# Patient Record
Sex: MALE | Race: WHITE | Employment: UNEMPLOYED | URBAN - METROPOLITAN AREA
[De-identification: names, ages, dates, MRNs, and addresses within clinical notes are randomized per-mention and may not be internally consistent; named-entity substitution may affect disease eponyms.]

---

## 2022-10-08 ENCOUNTER — HOSPITAL ENCOUNTER (EMERGENCY)
Age: 52
Discharge: HOME | End: 2022-10-09
Payer: COMMERCIAL

## 2022-10-08 ENCOUNTER — APPOINTMENT (OUTPATIENT)
Dept: GENERAL RADIOLOGY | Age: 52
End: 2022-10-08
Payer: COMMERCIAL

## 2022-10-08 ENCOUNTER — APPOINTMENT (OUTPATIENT)
Dept: CT IMAGING | Age: 52
End: 2022-10-08
Payer: COMMERCIAL

## 2022-10-08 DIAGNOSIS — R19.7 NAUSEA VOMITING AND DIARRHEA: Primary | ICD-10-CM

## 2022-10-08 DIAGNOSIS — R11.2 NAUSEA VOMITING AND DIARRHEA: Primary | ICD-10-CM

## 2022-10-08 LAB
ALBUMIN SERPL-MCNC: 4.5 G/DL (ref 3.5–4.6)
ALP BLD-CCNC: 74 U/L (ref 35–104)
ALT SERPL-CCNC: 14 U/L (ref 0–41)
ANION GAP SERPL CALCULATED.3IONS-SCNC: 18 MEQ/L (ref 9–15)
AST SERPL-CCNC: 12 U/L (ref 0–40)
BACTERIA: NEGATIVE /HPF
BETA-HYDROXYBUTYRATE: 3.4 MG/DL (ref 0.2–2.8)
BILIRUB SERPL-MCNC: 0.9 MG/DL (ref 0.2–0.7)
BILIRUBIN URINE: NEGATIVE
BLOOD, URINE: NEGATIVE
BUN BLDV-MCNC: 15 MG/DL (ref 6–20)
CALCIUM SERPL-MCNC: 8.8 MG/DL (ref 8.5–9.9)
CHLORIDE BLD-SCNC: 97 MEQ/L (ref 95–107)
CLARITY: CLEAR
CO2: 17 MEQ/L (ref 20–31)
COLOR: YELLOW
CREAT SERPL-MCNC: 0.87 MG/DL (ref 0.7–1.2)
EPITHELIAL CELLS, UA: ABNORMAL /HPF (ref 0–5)
GFR AFRICAN AMERICAN: >60
GFR NON-AFRICAN AMERICAN: >60
GLOBULIN: 2.6 G/DL (ref 2.3–3.5)
GLUCOSE BLD-MCNC: 173 MG/DL (ref 70–99)
GLUCOSE BLD-MCNC: 183 MG/DL (ref 70–99)
GLUCOSE URINE: NEGATIVE MG/DL
HYALINE CASTS: ABNORMAL /HPF (ref 0–5)
INFLUENZA A BY PCR: NEGATIVE
INFLUENZA B BY PCR: NEGATIVE
KETONES, URINE: ABNORMAL MG/DL
LACTIC ACID: 1.5 MMOL/L (ref 0.5–2.2)
LEUKOCYTE ESTERASE, URINE: NEGATIVE
LIPASE: 17 U/L (ref 12–95)
MAGNESIUM: 1.6 MG/DL (ref 1.7–2.4)
NITRITE, URINE: NEGATIVE
PERFORMED ON: ABNORMAL
PH UA: 5 (ref 5–9)
POTASSIUM SERPL-SCNC: 3.7 MEQ/L (ref 3.4–4.9)
PROTEIN UA: 30 MG/DL
RBC UA: ABNORMAL /HPF (ref 0–5)
SARS-COV-2, NAAT: NOT DETECTED
SODIUM BLD-SCNC: 132 MEQ/L (ref 135–144)
SPECIFIC GRAVITY UA: 1.03 (ref 1–1.03)
TOTAL PROTEIN: 7.1 G/DL (ref 6.3–8)
URINE REFLEX TO CULTURE: ABNORMAL
UROBILINOGEN, URINE: 0.2 E.U./DL
WBC UA: ABNORMAL /HPF (ref 0–5)

## 2022-10-08 PROCEDURE — 96374 THER/PROPH/DIAG INJ IV PUSH: CPT

## 2022-10-08 PROCEDURE — 96375 TX/PRO/DX INJ NEW DRUG ADDON: CPT

## 2022-10-08 PROCEDURE — 81001 URINALYSIS AUTO W/SCOPE: CPT

## 2022-10-08 PROCEDURE — 83735 ASSAY OF MAGNESIUM: CPT

## 2022-10-08 PROCEDURE — 36415 COLL VENOUS BLD VENIPUNCTURE: CPT

## 2022-10-08 PROCEDURE — 83605 ASSAY OF LACTIC ACID: CPT

## 2022-10-08 PROCEDURE — 74177 CT ABD & PELVIS W/CONTRAST: CPT

## 2022-10-08 PROCEDURE — 85025 COMPLETE CBC W/AUTO DIFF WBC: CPT

## 2022-10-08 PROCEDURE — 2500000003 HC RX 250 WO HCPCS

## 2022-10-08 PROCEDURE — A4216 STERILE WATER/SALINE, 10 ML: HCPCS

## 2022-10-08 PROCEDURE — 83690 ASSAY OF LIPASE: CPT

## 2022-10-08 PROCEDURE — 87502 INFLUENZA DNA AMP PROBE: CPT

## 2022-10-08 PROCEDURE — 82010 KETONE BODYS QUAN: CPT

## 2022-10-08 PROCEDURE — 99285 EMERGENCY DEPT VISIT HI MDM: CPT

## 2022-10-08 PROCEDURE — 87635 SARS-COV-2 COVID-19 AMP PRB: CPT

## 2022-10-08 PROCEDURE — 6360000004 HC RX CONTRAST MEDICATION

## 2022-10-08 PROCEDURE — 80053 COMPREHEN METABOLIC PANEL: CPT

## 2022-10-08 PROCEDURE — 2580000003 HC RX 258

## 2022-10-08 PROCEDURE — 36600 WITHDRAWAL OF ARTERIAL BLOOD: CPT

## 2022-10-08 PROCEDURE — 71045 X-RAY EXAM CHEST 1 VIEW: CPT

## 2022-10-08 PROCEDURE — 6360000002 HC RX W HCPCS

## 2022-10-08 PROCEDURE — 6370000000 HC RX 637 (ALT 250 FOR IP)

## 2022-10-08 RX ORDER — ONDANSETRON 2 MG/ML
4 INJECTION INTRAMUSCULAR; INTRAVENOUS ONCE
Status: COMPLETED | OUTPATIENT
Start: 2022-10-08 | End: 2022-10-08

## 2022-10-08 RX ORDER — 0.9 % SODIUM CHLORIDE 0.9 %
1000 INTRAVENOUS SOLUTION INTRAVENOUS ONCE
Status: COMPLETED | OUTPATIENT
Start: 2022-10-08 | End: 2022-10-09

## 2022-10-08 RX ADMIN — SODIUM CHLORIDE 1000 ML: 9 INJECTION, SOLUTION INTRAVENOUS at 22:49

## 2022-10-08 RX ADMIN — LIDOCAINE HYDROCHLORIDE: 20 SOLUTION ORAL; TOPICAL at 22:50

## 2022-10-08 RX ADMIN — ONDANSETRON 4 MG: 2 INJECTION INTRAMUSCULAR; INTRAVENOUS at 22:51

## 2022-10-08 RX ADMIN — FAMOTIDINE 20 MG: 10 INJECTION, SOLUTION INTRAVENOUS at 22:51

## 2022-10-08 RX ADMIN — IOPAMIDOL 75 ML: 612 INJECTION, SOLUTION INTRAVENOUS at 23:37

## 2022-10-08 ASSESSMENT — PAIN - FUNCTIONAL ASSESSMENT: PAIN_FUNCTIONAL_ASSESSMENT: NONE - DENIES PAIN

## 2022-10-09 VITALS
SYSTOLIC BLOOD PRESSURE: 142 MMHG | TEMPERATURE: 99.8 F | RESPIRATION RATE: 21 BRPM | HEART RATE: 84 BPM | OXYGEN SATURATION: 98 % | BODY MASS INDEX: 40.43 KG/M2 | HEIGHT: 74 IN | WEIGHT: 315 LBS | DIASTOLIC BLOOD PRESSURE: 72 MMHG

## 2022-10-09 LAB
ACANTHOCYTES: 0
ANISOCYTOSIS: 0
AUER RODS: 0
BASE EXCESS VENOUS: -6 (ref -3–3)
BASOPHILIC STIPPLING: 0
BASOPHILS ABSOLUTE: 0 K/UL (ref 0–0.2)
BASOPHILS RELATIVE PERCENT: 0.4 %
BURR CELLS: 0
CABOT RINGS: 0
CALCIUM IONIZED: 1.05 MMOL/L (ref 1.12–1.32)
DOHLE BODIES: 0
EOSINOPHILS ABSOLUTE: 0 K/UL (ref 0–0.7)
EOSINOPHILS RELATIVE PERCENT: 0.1 %
GFR AFRICAN AMERICAN: >60
GFR AFRICAN AMERICAN: >60
GFR NON-AFRICAN AMERICAN: >60
GFR NON-AFRICAN AMERICAN: >60
GLUCOSE BLD-MCNC: 152 MG/DL (ref 70–99)
HAIRY CELLS: 0
HCO3 VENOUS: 17.7 MMOL/L (ref 23–29)
HCT VFR BLD CALC: 46.2 % (ref 42–52)
HEMOGLOBIN: 15.6 GM/DL (ref 13.5–17.5)
HEMOGLOBIN: 15.9 G/DL (ref 14–18)
HOWELL-JOLLY BODIES: 0
HYPERSEGMENTED NEUTROPHILS: 0
HYPOCHROMIA: 0
LACTATE: 2.19 MMOL/L (ref 0.4–2)
LYMPHOCYTES ABSOLUTE: 0.6 K/UL (ref 1–4.8)
LYMPHOCYTES RELATIVE PERCENT: 5 %
MACROCYTES: 0
MCH RBC QN AUTO: 31.7 PG (ref 27–31.3)
MCHC RBC AUTO-ENTMCNC: 34.5 % (ref 33–37)
MCV RBC AUTO: 92.1 FL (ref 80–100)
MICROCYTES: 0
MONOCYTES ABSOLUTE: 0.4 K/UL (ref 0.2–0.8)
MONOCYTES RELATIVE PERCENT: 2.8 %
NEUTROPHILS ABSOLUTE: 11.4 K/UL (ref 1.4–6.5)
NEUTROPHILS RELATIVE PERCENT: 93 %
O2 SAT, VEN: 100 %
OVALOCYTES: 0
PAPPENHEIMER BODIES: 0
PCO2, VEN: 25.1 MM HG (ref 40–50)
PDW BLD-RTO: 13.1 % (ref 11.5–14.5)
PELGER HUET CELLS: 0 %
PERFORMED ON: ABNORMAL
PERFORMED ON: NORMAL
PH VENOUS: 7.46 (ref 7.32–7.42)
PLATELET # BLD: 213 K/UL (ref 130–400)
PLATELET SLIDE REVIEW: NORMAL
PO2, VEN: 225 MM HG
POC CHLORIDE: 105 MEQ/L (ref 99–110)
POC CREATININE: 0.9 MG/DL (ref 0.8–1.3)
POC CREATININE: 0.9 MG/DL (ref 0.8–1.3)
POC FIO2: 21
POC HEMATOCRIT: 46 % (ref 41–53)
POC POTASSIUM: 3.9 MEQ/L (ref 3.5–5.1)
POC SAMPLE TYPE: ABNORMAL
POC SAMPLE TYPE: NORMAL
POC SODIUM: 136 MEQ/L (ref 136–145)
POIKILOCYTES: 0
POLYCHROMASIA: 0
RBC # BLD: 5.02 M/UL (ref 4.7–6.1)
RBC # BLD: NORMAL 10*6/UL
SCHISTOCYTES: 0
SICKLE CELLS: 0
SPHEROCYTES: 0
STOMATOCYTES: 0
TARGET CELLS: 0
TCO2 CALC VENOUS: 19 MMOL/L
TEAR DROP CELLS: 0
TOXIC GRANULATION: 0
VACUOLATED NEUTROPHILS: 0
WBC # BLD: 12.3 K/UL (ref 4.8–10.8)

## 2022-10-09 PROCEDURE — 83605 ASSAY OF LACTIC ACID: CPT

## 2022-10-09 PROCEDURE — 6370000000 HC RX 637 (ALT 250 FOR IP)

## 2022-10-09 PROCEDURE — 84132 ASSAY OF SERUM POTASSIUM: CPT

## 2022-10-09 PROCEDURE — 82330 ASSAY OF CALCIUM: CPT

## 2022-10-09 PROCEDURE — 85014 HEMATOCRIT: CPT

## 2022-10-09 PROCEDURE — 82565 ASSAY OF CREATININE: CPT

## 2022-10-09 PROCEDURE — 84295 ASSAY OF SERUM SODIUM: CPT

## 2022-10-09 PROCEDURE — 82435 ASSAY OF BLOOD CHLORIDE: CPT

## 2022-10-09 PROCEDURE — 82803 BLOOD GASES ANY COMBINATION: CPT

## 2022-10-09 RX ORDER — ONDANSETRON 4 MG/1
4 TABLET, ORALLY DISINTEGRATING ORAL EVERY 8 HOURS PRN
Qty: 21 TABLET | Refills: 0 | Status: SHIPPED | OUTPATIENT
Start: 2022-10-09

## 2022-10-09 RX ORDER — ACETAMINOPHEN 500 MG
1000 TABLET ORAL ONCE
Status: COMPLETED | OUTPATIENT
Start: 2022-10-09 | End: 2022-10-09

## 2022-10-09 RX ADMIN — ACETAMINOPHEN 1000 MG: 500 TABLET ORAL at 01:45

## 2022-10-09 ASSESSMENT — ENCOUNTER SYMPTOMS
PHOTOPHOBIA: 0
NAUSEA: 1
COUGH: 0
ABDOMINAL PAIN: 0
DIARRHEA: 1
ANAL BLEEDING: 0
ABDOMINAL DISTENTION: 0
VOMITING: 1
CONSTIPATION: 0
SHORTNESS OF BREATH: 0
BLOOD IN STOOL: 0

## 2022-10-09 NOTE — ED PROVIDER NOTES
[address] [facility] ED  eMERGENCY dEPARTMENT eNCOUnter      Pt Name: Orestes Doe  MRN: [id number]  [place] 1970  Date of evaluation: 10/8/2022  Provider: JAMAL Cronin        HISTORY OF PRESENT ILLNESS    Orestes Doe is a 46 y.o. male per chart review has ah/o T2DM, asthma, depression. Reports nausea vomiting diarrhea x1 day. No blood in the emesis or stool. Reports symptoms onset after dinner last evening. He had just eaten [employer]. Reports his roommate in the home is also experiencing similar symptoms however she is undergoing treatment with chemotherapy and has intermittent similar symptoms from this. Reports cyclic fever and chills. Reports cough but he feels that may be a sensation of having to vomit. Reports urinary frequency. Reports history of type 2 diabetes, states last A1c was 10, states he only takes oral diabetic medications, has not been able to tolerate for the past day 2/2 emesis. Denies diabetic complications at this point. Denies history of DKA or hospitalization for diabetes. Denies any abdominal pains. No shortness of breath or chest pains. No CAD history. No prior abdominal conditions or surgeries. REVIEW OF SYSTEMS       Review of Systems   Constitutional:  Positive for appetite change, chills and fever. Negative for diaphoresis and fatigue. HENT:  Negative for congestion. Eyes:  Negative for photophobia and visual disturbance. Respiratory:  Negative for cough and shortness of breath. Cardiovascular:  Negative for chest pain. Gastrointestinal:  Positive for diarrhea, nausea and vomiting. Negative for abdominal distention, abdominal pain, anal bleeding, blood in stool and constipation. Genitourinary:  Positive for frequency. Negative for difficulty urinating, dysuria, flank pain, hematuria, penile pain and urgency. Musculoskeletal:  Negative for myalgias. Neurological:  Negative for weakness, light-headedness and headaches. Psychiatric/Behavioral:  Negative for confusion. Except as noted above the remainder of the review of systems was reviewed and negative. PAST MEDICAL HISTORY   History reviewed. No pertinent past medical history. SURGICAL HISTORY     History reviewed. No pertinent surgical history. CURRENT MEDICATIONS       Previous Medications    No medications on file       ALLERGIES     Glipizide    FAMILY HISTORY     History reviewed. No pertinent family history. SOCIAL HISTORY       Social History     Socioeconomic History    Marital status: Single     Spouse name: None    Number of children: None    Years of education: None    Highest education level: None   Tobacco Use    Smoking status: Every Day     Packs/day: 2.00     Types: Cigarettes    Smokeless tobacco: Never   Vaping Use    Vaping Use: Never used   Substance and Sexual Activity    Alcohol use: Not Currently    Drug use: Never    Sexual activity: Not Currently         PHYSICAL EXAM        ED Triage Vitals [10/08/22 2147]   BP Temp Temp Source Heart Rate Resp SpO2 Height Weight   (!) 143/78 99.8 °F (37.7 °C) Oral 88 18 97 % 6' 2\" (1.88 m) (!) 330 lb (149.7 kg)       Physical Exam  Constitutional:       General: He is not in acute distress. Appearance: Normal appearance. He is not ill-appearing, toxic-appearing or diaphoretic. HENT:      Head: Normocephalic and atraumatic. Right Ear: External ear normal.      Left Ear: External ear normal.      Nose: Nose normal.      Mouth/Throat:      Mouth: Mucous membranes are moist.      Pharynx: Oropharynx is clear. Eyes:      Extraocular Movements: Extraocular movements intact. Cardiovascular:      Rate and Rhythm: Normal rate and regular rhythm. Pulmonary:      Effort: Pulmonary effort is normal. No respiratory distress. Breath sounds: Normal breath sounds. No wheezing. Abdominal:      General: Bowel sounds are normal. There is no distension. Palpations: Abdomen is soft. Calcium, Ionized 1.05 (*)     pH, Arron 7.457 (*)     pCO2, Arron 25.1 (*)     HCO3, Venous 17.7 (*)     Base Excess, Arron -6 (*)     Lactate 2.19 (*)     All other components within normal limits   COVID-19, RAPID   RAPID INFLUENZA A/B ANTIGENS   LACTIC ACID   LIPASE   POCT EPOC BLOOD GAS, LACTIC ACID, ICA   POCT VENOUS         MDM:   Vitals:    Vitals:    10/08/22 2147 10/08/22 2230 10/09/22 0000   BP: (!) 143/78 139/77 (!) 142/72   Pulse: 88 82 84   Resp: 18 17 21   Temp: 99.8 °F (37.7 °C)     TempSrc: Oral     SpO2: 97%  98%   Weight: (!) 330 lb (149.7 kg)     Height: 6' 2\" (1.88 m)         59-year-old male patient to the emergency department with persistent nausea vomiting diarrhea since dinner last evening. Reports associated fever, chills, urinary frequency. Presents afebrile, VSS. Soft nondistended nontender abdomen. Nontoxic nondiaphoretic nondistressed appearing. Given IV NS bolus, IV Zofran, IV Pepcid, GI cocktail emergency department. Reports symptom improvement. No persistent nausea vomiting or diarrhea while in the emergency department. Tolerating p.o. intake. CT abdomen pelvis is nonacute. Laboratory studies demonstrate slight dehydration. Reactive mild leukocytosis. Patient not acidotic or significantly hyperglycemic. Not consistent presentation with DKA. Likely gastroenteritis. Will provide supportive medication, discussed oral hydration and soft diet for home. Discussed strict return precautions and follow-up. He understands and agrees to this plan. CRITICAL CARE TIME   Total CriticalCare time was 0 minutes, excluding separately reportable procedures. There was a high probability of clinically significant/life threatening deterioration in the patient's condition which required my urgent intervention. PROCEDURES:  Unlessotherwise noted below, none      Procedures      FINAL IMPRESSION      1.  Nausea vomiting and diarrhea          DISPOSITION/PLAN   DISPOSITION Decision To Discharge 10/09/2022 01:25:43 AM          JAMAL Zapata (electronically signed)  Attending Emergency Physician          Marcial Zapata  10/09/22 0140

## 2022-10-09 NOTE — ED NOTES
Pt understands discharge instructions.   Pt instructed to follow up with PCP   Prescriptions explained   Pt told to come back for new or worsening symptoms  No further questions         Dar Stevens RN  10/09/22 0148

## 2022-10-12 LAB
EKG ATRIAL RATE: 82 BPM
EKG P AXIS: 59 DEGREES
EKG P-R INTERVAL: 186 MS
EKG Q-T INTERVAL: 406 MS
EKG QRS DURATION: 110 MS
EKG QTC CALCULATION (BAZETT): 474 MS
EKG R AXIS: -32 DEGREES
EKG T AXIS: 52 DEGREES
EKG VENTRICULAR RATE: 82 BPM

## 2025-02-11 ENCOUNTER — OFFICE VISIT (OUTPATIENT)
Dept: GASTROENTEROLOGY | Age: 55
End: 2025-02-11
Payer: MEDICARE

## 2025-02-11 ENCOUNTER — PREP FOR PROCEDURE (OUTPATIENT)
Dept: GASTROENTEROLOGY | Age: 55
End: 2025-02-11

## 2025-02-11 VITALS — HEART RATE: 71 BPM | OXYGEN SATURATION: 96 % | BODY MASS INDEX: 40.43 KG/M2 | WEIGHT: 315 LBS | HEIGHT: 74 IN

## 2025-02-11 DIAGNOSIS — R19.7 DIARRHEA, UNSPECIFIED TYPE: ICD-10-CM

## 2025-02-11 DIAGNOSIS — R11.2 NAUSEA AND VOMITING, UNSPECIFIED VOMITING TYPE: ICD-10-CM

## 2025-02-11 DIAGNOSIS — R10.84 GENERALIZED ABDOMINAL PAIN: ICD-10-CM

## 2025-02-11 DIAGNOSIS — R11.0 NAUSEA: Primary | ICD-10-CM

## 2025-02-11 DIAGNOSIS — R15.9 INCONTINENCE OF FECES, UNSPECIFIED FECAL INCONTINENCE TYPE: ICD-10-CM

## 2025-02-11 PROCEDURE — 3017F COLORECTAL CA SCREEN DOC REV: CPT | Performed by: INTERNAL MEDICINE

## 2025-02-11 PROCEDURE — 99204 OFFICE O/P NEW MOD 45 MIN: CPT | Performed by: INTERNAL MEDICINE

## 2025-02-11 PROCEDURE — G8427 DOCREV CUR MEDS BY ELIG CLIN: HCPCS | Performed by: INTERNAL MEDICINE

## 2025-02-11 PROCEDURE — G8417 CALC BMI ABV UP PARAM F/U: HCPCS | Performed by: INTERNAL MEDICINE

## 2025-02-11 PROCEDURE — 4004F PT TOBACCO SCREEN RCVD TLK: CPT | Performed by: INTERNAL MEDICINE

## 2025-02-11 RX ORDER — PANTOPRAZOLE SODIUM 40 MG/1
40 TABLET, DELAYED RELEASE ORAL
Qty: 30 TABLET | Refills: 2 | Status: SHIPPED | OUTPATIENT
Start: 2025-02-11

## 2025-02-11 RX ORDER — ALLOPURINOL 100 MG/1
100 TABLET ORAL
COMMUNITY
Start: 2025-01-30

## 2025-02-11 RX ORDER — PIOGLITAZONE 30 MG/1
1 TABLET ORAL DAILY
COMMUNITY
Start: 2024-06-03 | End: 2025-06-04

## 2025-02-11 RX ORDER — ATORVASTATIN CALCIUM 40 MG/1
20 TABLET, FILM COATED ORAL
COMMUNITY
Start: 2024-07-30

## 2025-02-11 RX ORDER — SODIUM CHLORIDE 9 MG/ML
INJECTION, SOLUTION INTRAVENOUS PRN
Status: CANCELLED | OUTPATIENT
Start: 2025-02-11

## 2025-02-11 RX ORDER — LORATADINE 10 MG/1
10 TABLET ORAL
COMMUNITY
Start: 2025-01-30

## 2025-02-11 RX ORDER — SODIUM CHLORIDE 9 MG/ML
INJECTION, SOLUTION INTRAVENOUS CONTINUOUS
Status: CANCELLED | OUTPATIENT
Start: 2025-02-11

## 2025-02-11 RX ORDER — MONTELUKAST SODIUM 10 MG/1
1 TABLET ORAL DAILY
COMMUNITY
Start: 2024-09-05

## 2025-02-11 RX ORDER — MELOXICAM 15 MG/1
15 TABLET ORAL
COMMUNITY
Start: 2024-07-18

## 2025-02-11 RX ORDER — LITHIUM CARBONATE 450 MG
1 TABLET, EXTENDED RELEASE ORAL 2 TIMES DAILY
COMMUNITY
Start: 2024-12-26

## 2025-02-11 RX ORDER — SODIUM CHLORIDE 0.9 % (FLUSH) 0.9 %
5-40 SYRINGE (ML) INJECTION EVERY 12 HOURS SCHEDULED
Status: CANCELLED | OUTPATIENT
Start: 2025-02-11

## 2025-02-11 RX ORDER — LACTOBACILLUS RHAMNOSUS GG 10B CELL
1 CAPSULE ORAL DAILY
Qty: 30 CAPSULE | Refills: 1 | Status: SHIPPED | OUTPATIENT
Start: 2025-02-11

## 2025-02-11 RX ORDER — SODIUM CHLORIDE 0.9 % (FLUSH) 0.9 %
5-40 SYRINGE (ML) INJECTION PRN
Status: CANCELLED | OUTPATIENT
Start: 2025-02-11

## 2025-02-11 RX ORDER — ALUMINUM ZIRCONIUM OCTACHLOROHYDREX GLY 16 G/100G
GEL TOPICAL
Qty: 425 G | Refills: 3 | Status: SHIPPED | OUTPATIENT
Start: 2025-02-11

## 2025-02-11 RX ORDER — TRAZODONE HYDROCHLORIDE 100 MG/1
200 TABLET ORAL
COMMUNITY
Start: 2024-12-18

## 2025-02-11 RX ORDER — ALBUTEROL SULFATE 90 UG/1
INHALANT RESPIRATORY (INHALATION)
COMMUNITY
Start: 2024-07-30

## 2025-02-11 NOTE — PROGRESS NOTES
Gastroenterology Clinic Visit    Jc Winters  [id number]  Chief Complaint   Patient presents with    New Patient     History of Present Illness  The patient is a 54-year-old male who presents for evaluation of diarrhea.    He has been experiencing chronic diarrhea for approximately one year, with daily loose, watery bowel movements. He has not attempted any fiber supplementation. He reports generalized abdominal discomfort, which is exacerbated by coughing or sneezing, leading to incontinence. He also experiences intermittent nausea and abdominal cramping, the frequency of which varies with his activity level. He has undergone extensive diagnostic testing, including a CT scan, blood work, and stool studies, all of which have returned normal results. He is uncertain if a calprotectin test was performed. He has been referred for a hydrogen breath test to investigate potential bacterial causes of his symptoms. He has not undergone gallbladder surgery. He has a history of weight fluctuations, typically gaining about 15 pounds each winter and subsequently losing it. He has tried Greek yogurt, but it exacerbated his symptoms.    He has a long-standing history of acid reflux, for which he was prescribed medication, but he has not been compliant with the treatment. He recalls that the medication seemed to worsen his symptoms. He occasionally uses Tums for symptom relief.    He has a history of tardive dyskinesia, characterized by tremors in his right hand and foot, which were severe until a certain medication was discontinued. He still experiences these symptoms when nervous or anxious, particularly when writing or using a screwdriver.    He has been diagnosed with diabetes for over four years, with a recent HbA1c level of 5.8.    He has a history of severe depression and general anxiety disorder. He is under the care of a psychiatrist, Dr. Ge Cisneros, who monitors his weight and blood pressure during visits.    He has

## 2025-03-20 ENCOUNTER — TELEPHONE (OUTPATIENT)
Dept: GASTROENTEROLOGY | Age: 55
End: 2025-03-20

## 2025-03-21 ENCOUNTER — ANESTHESIA (OUTPATIENT)
Dept: ENDOSCOPY | Age: 55
End: 2025-03-21
Payer: OTHER GOVERNMENT

## 2025-03-21 ENCOUNTER — HOSPITAL ENCOUNTER (OUTPATIENT)
Age: 55
Setting detail: OUTPATIENT SURGERY
Discharge: HOME | End: 2025-03-21
Attending: INTERNAL MEDICINE | Admitting: INTERNAL MEDICINE
Payer: OTHER GOVERNMENT

## 2025-03-21 ENCOUNTER — ANESTHESIA EVENT (OUTPATIENT)
Dept: ENDOSCOPY | Age: 55
End: 2025-03-21
Payer: OTHER GOVERNMENT

## 2025-03-21 VITALS
OXYGEN SATURATION: 97 % | DIASTOLIC BLOOD PRESSURE: 60 MMHG | SYSTOLIC BLOOD PRESSURE: 125 MMHG | TEMPERATURE: 98.2 F | HEIGHT: 74 IN | HEART RATE: 52 BPM | RESPIRATION RATE: 18 BRPM | WEIGHT: 315 LBS | BODY MASS INDEX: 40.43 KG/M2

## 2025-03-21 DIAGNOSIS — R11.2 NAUSEA AND VOMITING: ICD-10-CM

## 2025-03-21 DIAGNOSIS — R15.9 INCONTINENCE OF FECES: ICD-10-CM

## 2025-03-21 DIAGNOSIS — R19.7 DIARRHEA: ICD-10-CM

## 2025-03-21 DIAGNOSIS — R10.84 GENERALIZED ABDOMINAL PAIN: ICD-10-CM

## 2025-03-21 LAB
GLUCOSE BLD-MCNC: 120 MG/DL (ref 70–99)
PERFORMED ON: ABNORMAL

## 2025-03-21 PROCEDURE — 3700000001 HC ADD 15 MINUTES (ANESTHESIA): Performed by: INTERNAL MEDICINE

## 2025-03-21 PROCEDURE — 2580000003 HC RX 258: Performed by: INTERNAL MEDICINE

## 2025-03-21 PROCEDURE — 3609027000 HC COLONOSCOPY: Performed by: INTERNAL MEDICINE

## 2025-03-21 PROCEDURE — 7100000011 HC PHASE II RECOVERY - ADDTL 15 MIN: Performed by: INTERNAL MEDICINE

## 2025-03-21 PROCEDURE — 88305 TISSUE EXAM BY PATHOLOGIST: CPT

## 2025-03-21 PROCEDURE — 2709999900 HC NON-CHARGEABLE SUPPLY: Performed by: INTERNAL MEDICINE

## 2025-03-21 PROCEDURE — 45380 COLONOSCOPY AND BIOPSY: CPT | Performed by: INTERNAL MEDICINE

## 2025-03-21 PROCEDURE — 6360000002 HC RX W HCPCS: Performed by: NURSE ANESTHETIST, CERTIFIED REGISTERED

## 2025-03-21 PROCEDURE — 43239 EGD BIOPSY SINGLE/MULTIPLE: CPT | Performed by: INTERNAL MEDICINE

## 2025-03-21 PROCEDURE — 3700000000 HC ANESTHESIA ATTENDED CARE: Performed by: INTERNAL MEDICINE

## 2025-03-21 PROCEDURE — 3609017100 HC EGD: Performed by: INTERNAL MEDICINE

## 2025-03-21 PROCEDURE — 7100000010 HC PHASE II RECOVERY - FIRST 15 MIN: Performed by: INTERNAL MEDICINE

## 2025-03-21 PROCEDURE — 2500000003 HC RX 250 WO HCPCS: Performed by: INTERNAL MEDICINE

## 2025-03-21 RX ORDER — FLUTICASONE PROPIONATE AND SALMETEROL 500; 50 UG/1; UG/1
1 POWDER RESPIRATORY (INHALATION) EVERY 12 HOURS
COMMUNITY

## 2025-03-21 RX ORDER — CALCIUM CARBONATE 500 MG/1
1 TABLET, CHEWABLE ORAL DAILY
COMMUNITY

## 2025-03-21 RX ORDER — PROPOFOL 10 MG/ML
INJECTION, EMULSION INTRAVENOUS
Status: DISCONTINUED | OUTPATIENT
Start: 2025-03-21 | End: 2025-03-21 | Stop reason: SDUPTHER

## 2025-03-21 RX ORDER — SODIUM CHLORIDE 9 MG/ML
INJECTION, SOLUTION INTRAVENOUS PRN
Status: DISCONTINUED | OUTPATIENT
Start: 2025-03-21 | End: 2025-03-21 | Stop reason: HOSPADM

## 2025-03-21 RX ORDER — COVID-19 ANTIGEN TEST
KIT MISCELLANEOUS
COMMUNITY

## 2025-03-21 RX ORDER — LIDOCAINE HYDROCHLORIDE 20 MG/ML
INJECTION, SOLUTION EPIDURAL; INFILTRATION; INTRACAUDAL; PERINEURAL
Status: DISCONTINUED | OUTPATIENT
Start: 2025-03-21 | End: 2025-03-21 | Stop reason: SDUPTHER

## 2025-03-21 RX ORDER — SODIUM CHLORIDE 0.9 % (FLUSH) 0.9 %
5-40 SYRINGE (ML) INJECTION EVERY 12 HOURS SCHEDULED
Status: DISCONTINUED | OUTPATIENT
Start: 2025-03-21 | End: 2025-03-21 | Stop reason: HOSPADM

## 2025-03-21 RX ORDER — SODIUM CHLORIDE 0.9 % (FLUSH) 0.9 %
5-40 SYRINGE (ML) INJECTION PRN
Status: DISCONTINUED | OUTPATIENT
Start: 2025-03-21 | End: 2025-03-21 | Stop reason: HOSPADM

## 2025-03-21 RX ORDER — SODIUM CHLORIDE 9 MG/ML
INJECTION, SOLUTION INTRAVENOUS CONTINUOUS
Status: DISCONTINUED | OUTPATIENT
Start: 2025-03-21 | End: 2025-03-21 | Stop reason: HOSPADM

## 2025-03-21 RX ADMIN — PROPOFOL 50 MG: 10 INJECTION, EMULSION INTRAVENOUS at 09:24

## 2025-03-21 RX ADMIN — PROPOFOL 50 MG: 10 INJECTION, EMULSION INTRAVENOUS at 09:36

## 2025-03-21 RX ADMIN — PROPOFOL 60 MG: 10 INJECTION, EMULSION INTRAVENOUS at 09:43

## 2025-03-21 RX ADMIN — PROPOFOL 50 MG: 10 INJECTION, EMULSION INTRAVENOUS at 09:30

## 2025-03-21 RX ADMIN — SODIUM CHLORIDE: 0.9 INJECTION, SOLUTION INTRAVENOUS at 08:16

## 2025-03-21 RX ADMIN — PROPOFOL 50 MG: 10 INJECTION, EMULSION INTRAVENOUS at 09:27

## 2025-03-21 RX ADMIN — LIDOCAINE HYDROCHLORIDE 100 MG: 20 INJECTION, SOLUTION EPIDURAL; INFILTRATION; INTRACAUDAL; PERINEURAL at 09:10

## 2025-03-21 RX ADMIN — PROPOFOL 50 MG: 10 INJECTION, EMULSION INTRAVENOUS at 09:21

## 2025-03-21 RX ADMIN — PROPOFOL 50 MG: 10 INJECTION, EMULSION INTRAVENOUS at 09:17

## 2025-03-21 RX ADMIN — PROPOFOL 50 MG: 10 INJECTION, EMULSION INTRAVENOUS at 09:33

## 2025-03-21 RX ADMIN — PROPOFOL 50 MG: 10 INJECTION, EMULSION INTRAVENOUS at 09:14

## 2025-03-21 RX ADMIN — PROPOFOL 60 MG: 10 INJECTION, EMULSION INTRAVENOUS at 09:39

## 2025-03-21 RX ADMIN — PROPOFOL 100 MG: 10 INJECTION, EMULSION INTRAVENOUS at 09:10

## 2025-03-21 ASSESSMENT — PAIN - FUNCTIONAL ASSESSMENT
PAIN_FUNCTIONAL_ASSESSMENT: NONE - DENIES PAIN
PAIN_FUNCTIONAL_ASSESSMENT: 0-10

## 2025-03-21 ASSESSMENT — PAIN DESCRIPTION - DESCRIPTORS: DESCRIPTORS: ACHING

## 2025-03-21 NOTE — H&P
Patient Name: Jc Winters  : 1970  MRN: [id number]  DATE: 25      ENDOSCOPY  History and Physical    Procedure:    [x] Diagnostic Colonoscopy       [] Screening Colonoscopy  [x] EGD      [] ERCP      [] EUS       [] Other    [x] Previous office notes/History and Physical reviewed from the patients chart. Please see EMR for further details of HPI. I have examined the patient's status immediately prior to the procedure and:      Indications/HPI:    []Abdominal Pain   []Cancer- GI/Lung  []Fhx of colon CA  []History of Polyps   []Dunn’s   []Melena  []Abnormal Imaging   []Dysphagia    []Persistent Pneumonia  []Anemia   []Food Impaction  []History of Polyps  []GI Bleed   []Pulmonary nodule/Mass  []Change in bowel habits  [x]Heartburn/Reflux  []Rectal Bleed (BRBPR)  []Chest Pain - Non Cardiac  []Heme (+) Stool  []Ulcers  []Constipation   []Hemoptysis   []Varices  [x]Diarrhea   []Hypoxemia  []Nausea/Vomiting   []Screening   []Crohns/Colitis  []Other:    Anesthesia:   [x] MAC [] Moderate Sedation   [] General   [] None     ROS: 12 pt Review of Symptoms was negative unless mentioned above    Medications:   Prior to Admission medications    Medication Sig Start Date End Date Taking? Authorizing Provider   fluticasone-salmeterol (ADVAIR) 500-50 MCG/ACT AEPB diskus inhaler Inhale 1 puff into the lungs in the morning and 1 puff in the evening.   Yes Naga Stone MD   vitamin D 25 MCG (1000 UT) CAPS Take by mouth   Yes Naga Stone MD   calcium carbonate (TUMS) 500 MG chewable tablet Take 1 tablet by mouth daily   Yes Naga Stone MD   Naproxen Sodium (ALEVE) 220 MG CAPS Take by mouth   Yes Naga Stone MD   allopurinol (ZYLOPRIM) 100 MG tablet Take 1 tablet by mouth 25  Yes Naga Stone MD   atorvastatin (LIPITOR) 40 MG tablet Take 0.5 tablets by mouth 24  Yes Naga Stone MD   FLUoxetine (PROZAC) 20 MG capsule Take 4 capsules by mouth 24  Yes    Vaping Use    Vaping status: Never Used   Substance Use Topics    Alcohol use: Yes     Comment: occassional    Drug use: Never     Vital Signs:   Vitals:    03/21/25 0805   BP: 131/61   Pulse: 61   Resp: 18   Temp: 98.2 °F (36.8 °C)   SpO2: 96%       Physical Exam:  Cardiac:  [x]WNL []Comments:  Pulmonary:  [x]WNL []Comments:   Neuro/Mental Status:  [x]WNL []Comments:  Abdominal:  [x]WNL []Comments:  Other:   []WNL []Comments:    Informed Consent:  The risks and benefits of the procedure have been discussed with either the patient or if they cannot consent, their representative.    Assessment:  Patient examined and appropriate for planned sedation and procedure.     Plan:  Proceed with planned sedation and procedure as above.    The patient was counseled at length about risks of mallory COVID-19 in the perioperative and any recovery window from the procedure.  The patient was made aware that mallory COVID-19 may worsen their prognosis for recovery from their procedure and lend to a higher morbidity and-all mortality risk.  The patient was given the option of postponing the procedure all material risks, benefits, and alternatives were discussed.  The patient does wish to proceed with the procedure at this time.    Rodolfo Duong MD  9:03 AM

## 2025-03-21 NOTE — ANESTHESIA PRE PROCEDURE
found for: \"ABORH\", \"LABANTI\"    Drug/Infectious Status (If Applicable):  No results found for: \"HIV\", \"HEPCAB\"    COVID-19 Screening (If Applicable):   Lab Results   Component Value Date/Time    COVID19 Not Detected 10/08/2022 10:32 PM           Anesthesia Evaluation  Patient summary reviewed  Airway: Mallampati: II  TM distance: >3 FB   Neck ROM: full  Mouth opening: > = 3 FB   Dental:          Pulmonary:normal exam    (+)           asthma:                            Cardiovascular:Negative CV ROS                      Neuro/Psych:   Negative Neuro/Psych ROS              GI/Hepatic/Renal:   (+) morbid obesity          Endo/Other:    (+) Diabetes.                 Abdominal: normal exam            Vascular: negative vascular ROS.         Other Findings:             Anesthesia Plan      MAC     ASA 3       Induction: intravenous.      Anesthetic plan and risks discussed with patient.      Plan discussed with surgical team.                    ELSI Frey - CRNA   3/21/2025

## 2025-03-21 NOTE — ANESTHESIA POSTPROCEDURE EVALUATION
Department of Anesthesiology  Postprocedure Note    Patient: Jc Winters  MRN: [id number]  YOB: 1970  Date of evaluation: 3/21/2025    Procedure Summary       Date: 03/21/25 Room / Location: [facility] OR 02 / [facility]    Anesthesia Start: 0907 Anesthesia Stop: 0947    Procedures:       ESOPHAGOGASTRODUODENOSCOPY      COLONOSCOPY DIAGNOSTIC Diagnosis:       Nausea and vomiting      Diarrhea      Generalized abdominal pain      Incontinence of feces      (Nausea and vomiting [R11.2])      (Diarrhea [R19.7])      (Generalized abdominal pain [R10.84])      (Incontinence of feces [R15.9])    Surgeons: Rodolfo Duong MD Responsible Provider: Africa Spence APRN - CRNA    Anesthesia Type: MAC ASA Status: 3            Anesthesia Type: No value filed.    Figueroa Phase I: Figueroa Score: 10    Figueroa Phase II:      Anesthesia Post Evaluation    Patient location during evaluation: PACU  Level of consciousness: awake  Pain score: 0  Airway patency: patent  Nausea & Vomiting: no vomiting and no nausea  Cardiovascular status: hemodynamically stable  Respiratory status: acceptable  Hydration status: stable  Pain management: adequate and satisfactory to patient        No notable events documented.

## 2025-03-25 ENCOUNTER — RESULTS FOLLOW-UP (OUTPATIENT)
Dept: GASTROENTEROLOGY | Age: 55
End: 2025-03-25

## 2025-04-02 NOTE — PROGRESS NOTES
Gastroenterology Clinic Follow up Visit    Jc Winters  [id number]  Chief Complaint   Patient presents with    Follow-up       HPI: 55 y.o. male following up after last GI clinic on 2/11/2025     Interval change: Patient is follow-up to GI clinic with history of diarrhea.  Patient had EGD and colonoscopy completed 3/21/2025 noting irregular Z-line and 2 gastric polyps, colon noted normal mucosa throughout.  Patient taking pantoprazole 40 on an almost daily basis.  Patient reports requiring food within 20 minutes due to abdominal burning, discomfort.  He does endorse not having this sensation on days that he missed taking pantoprazole and frequently does not take medication.  Patient reports having 2-3 loose to watery bowel movements in the a.m. within a 2-hour time frame.  He also endorses having postprandial loose to watery stools after evening meal.  He denies any blood nor mucus in stool.  Patient does endorse having abdominal cramping occurring 2 times a week associated with bowel movements.  Denies any unintentional weight loss, dysphagia, hematemesis, hematochezia, nor melena.    HPI from last GI clinic visit on 2/11/2025 summarized below:  The patient is a 54-year-old male who presents for evaluation of diarrhea.     He has been experiencing chronic diarrhea for approximately one year, with daily loose, watery bowel movements. He has not attempted any fiber supplementation. He reports generalized abdominal discomfort, which is exacerbated by coughing or sneezing, leading to incontinence. He also experiences intermittent nausea and abdominal cramping, the frequency of which varies with his activity level. He has undergone extensive diagnostic testing, including a CT scan, blood work, and stool studies, all of which have returned normal results. He is uncertain if a calprotectin test was performed. He has been referred for a hydrogen breath test to investigate potential bacterial causes of his symptoms. He

## 2025-04-03 ENCOUNTER — OFFICE VISIT (OUTPATIENT)
Dept: GASTROENTEROLOGY | Age: 55
End: 2025-04-03
Payer: OTHER GOVERNMENT

## 2025-04-03 VITALS — BODY MASS INDEX: 40.43 KG/M2 | OXYGEN SATURATION: 97 % | HEART RATE: 62 BPM | WEIGHT: 315 LBS | HEIGHT: 74 IN

## 2025-04-03 DIAGNOSIS — R15.2 FECAL URGENCY: ICD-10-CM

## 2025-04-03 DIAGNOSIS — K21.9 GASTROESOPHAGEAL REFLUX DISEASE WITHOUT ESOPHAGITIS: Primary | ICD-10-CM

## 2025-04-03 DIAGNOSIS — R19.7 DIARRHEA, UNSPECIFIED TYPE: ICD-10-CM

## 2025-04-03 PROCEDURE — 99213 OFFICE O/P EST LOW 20 MIN: CPT | Performed by: NURSE PRACTITIONER

## 2025-04-03 RX ORDER — FAMOTIDINE 20 MG/1
20 TABLET, FILM COATED ORAL 2 TIMES DAILY
Qty: 60 TABLET | Refills: 3 | Status: SHIPPED | OUTPATIENT
Start: 2025-04-03

## 2025-04-03 ASSESSMENT — ENCOUNTER SYMPTOMS
VOMITING: 0
RECTAL PAIN: 0
ANAL BLEEDING: 0
DIARRHEA: 1
NAUSEA: 0
TROUBLE SWALLOWING: 0
CONSTIPATION: 0
ABDOMINAL DISTENTION: 0
BLOOD IN STOOL: 0
ABDOMINAL PAIN: 0

## (undated) DEVICE — ENDO CARRY-ON PROCEDURE KIT: Brand: ENDO CARRY-ON PROCEDURE KIT

## (undated) DEVICE — BRUSH ENDO CLN L90.5IN SHTH DIA1.7MM BRIST DIA5-7MM 2-6MM

## (undated) DEVICE — TUBING, SUCTION, 1/4" X 10', STRAIGHT: Brand: MEDLINE

## (undated) DEVICE — TUBE SET 96 MM 64 MM H2O PERISTALTIC STD AUX CHANNEL

## (undated) DEVICE — SINGLE PORT MANIFOLD: Brand: NEPTUNE 2

## (undated) DEVICE — TUBING IRRIGATION 140/160/180/190 SER GI ENDOSCP SMARTCAP

## (undated) DEVICE — FORCEPS BX L240CM JAW DIA2.8MM L CAP W/ NDL MIC MESH TOOTH

## (undated) DEVICE — CONMED SCOPE SAVER BITE BLOCK, 20X27 MM: Brand: SCOPE SAVER